# Patient Record
Sex: FEMALE | Race: BLACK OR AFRICAN AMERICAN | Employment: PART TIME | URBAN - METROPOLITAN AREA
[De-identification: names, ages, dates, MRNs, and addresses within clinical notes are randomized per-mention and may not be internally consistent; named-entity substitution may affect disease eponyms.]

---
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
## 2023-09-28 ENCOUNTER — OFFICE VISIT (OUTPATIENT)
Dept: FAMILY MEDICINE CLINIC | Age: 23
End: 2023-09-28
Payer: COMMERCIAL

## 2023-09-28 VITALS
WEIGHT: 165 LBS | BODY MASS INDEX: 29.23 KG/M2 | DIASTOLIC BLOOD PRESSURE: 60 MMHG | SYSTOLIC BLOOD PRESSURE: 100 MMHG | OXYGEN SATURATION: 99 % | HEIGHT: 63 IN | HEART RATE: 71 BPM | TEMPERATURE: 97.1 F

## 2023-09-28 DIAGNOSIS — S81.831S GUNSHOT WOUND OF RIGHT LOWER LEG, SEQUELA: ICD-10-CM

## 2023-09-28 DIAGNOSIS — M21.372 ACQUIRED LEFT FOOT DROP: ICD-10-CM

## 2023-09-28 DIAGNOSIS — S81.832S GUNSHOT WOUND OF LEFT LOWER EXTREMITY, SEQUELA: Primary | ICD-10-CM

## 2023-09-28 DIAGNOSIS — R20.0 NUMBNESS OF RIGHT LOWER EXTREMITY: ICD-10-CM

## 2023-09-28 PROCEDURE — 99204 OFFICE O/P NEW MOD 45 MIN: CPT | Performed by: FAMILY MEDICINE

## 2023-09-28 RX ORDER — GABAPENTIN 100 MG/1
100 CAPSULE ORAL 2 TIMES DAILY
Qty: 60 CAPSULE | Refills: 0 | Status: SHIPPED | OUTPATIENT
Start: 2023-09-28 | End: 2023-10-28

## 2023-09-28 SDOH — ECONOMIC STABILITY: FOOD INSECURITY: WITHIN THE PAST 12 MONTHS, YOU WORRIED THAT YOUR FOOD WOULD RUN OUT BEFORE YOU GOT MONEY TO BUY MORE.: SOMETIMES TRUE

## 2023-09-28 SDOH — ECONOMIC STABILITY: FOOD INSECURITY: WITHIN THE PAST 12 MONTHS, THE FOOD YOU BOUGHT JUST DIDN'T LAST AND YOU DIDN'T HAVE MONEY TO GET MORE.: SOMETIMES TRUE

## 2023-09-28 SDOH — ECONOMIC STABILITY: INCOME INSECURITY: HOW HARD IS IT FOR YOU TO PAY FOR THE VERY BASICS LIKE FOOD, HOUSING, MEDICAL CARE, AND HEATING?: HARD

## 2023-09-28 SDOH — ECONOMIC STABILITY: HOUSING INSECURITY
IN THE LAST 12 MONTHS, WAS THERE A TIME WHEN YOU DID NOT HAVE A STEADY PLACE TO SLEEP OR SLEPT IN A SHELTER (INCLUDING NOW)?: NO

## 2023-09-28 ASSESSMENT — PATIENT HEALTH QUESTIONNAIRE - PHQ9
9. THOUGHTS THAT YOU WOULD BE BETTER OFF DEAD, OR OF HURTING YOURSELF: 0
SUM OF ALL RESPONSES TO PHQ QUESTIONS 1-9: 0
4. FEELING TIRED OR HAVING LITTLE ENERGY: 0
SUM OF ALL RESPONSES TO PHQ QUESTIONS 1-9: 0
5. POOR APPETITE OR OVEREATING: 0
7. TROUBLE CONCENTRATING ON THINGS, SUCH AS READING THE NEWSPAPER OR WATCHING TELEVISION: 0
2. FEELING DOWN, DEPRESSED OR HOPELESS: 0
6. FEELING BAD ABOUT YOURSELF - OR THAT YOU ARE A FAILURE OR HAVE LET YOURSELF OR YOUR FAMILY DOWN: 0
1. LITTLE INTEREST OR PLEASURE IN DOING THINGS: 0
SUM OF ALL RESPONSES TO PHQ9 QUESTIONS 1 & 2: 0
8. MOVING OR SPEAKING SO SLOWLY THAT OTHER PEOPLE COULD HAVE NOTICED. OR THE OPPOSITE, BEING SO FIGETY OR RESTLESS THAT YOU HAVE BEEN MOVING AROUND A LOT MORE THAN USUAL: 0
10. IF YOU CHECKED OFF ANY PROBLEMS, HOW DIFFICULT HAVE THESE PROBLEMS MADE IT FOR YOU TO DO YOUR WORK, TAKE CARE OF THINGS AT HOME, OR GET ALONG WITH OTHER PEOPLE: 0
3. TROUBLE FALLING OR STAYING ASLEEP: 0
SUM OF ALL RESPONSES TO PHQ QUESTIONS 1-9: 0
SUM OF ALL RESPONSES TO PHQ QUESTIONS 1-9: 0
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
## 2023-10-01 ASSESSMENT — ENCOUNTER SYMPTOMS
VOMITING: 0
ABDOMINAL PAIN: 0
BLOOD IN STOOL: 0
CONSTIPATION: 0
NAUSEA: 0
APNEA: 0
SHORTNESS OF BREATH: 0
CHEST TIGHTNESS: 0
COUGH: 0
DIARRHEA: 0

## 2023-10-17 ENCOUNTER — HOSPITAL ENCOUNTER (OUTPATIENT)
Dept: MRI IMAGING | Age: 23
Discharge: HOME | End: 2023-10-19
Payer: COMMERCIAL

## 2023-10-17 DIAGNOSIS — M21.372 ACQUIRED LEFT FOOT DROP: ICD-10-CM

## 2023-10-17 DIAGNOSIS — S81.832S GUNSHOT WOUND OF LEFT LOWER EXTREMITY, SEQUELA: ICD-10-CM

## 2023-10-17 PROCEDURE — 73718 MRI LOWER EXTREMITY W/O DYE: CPT

## 2023-10-25 ENCOUNTER — OFFICE VISIT (OUTPATIENT)
Dept: FAMILY MEDICINE CLINIC | Age: 23
End: 2023-10-25
Payer: COMMERCIAL

## 2023-10-25 VITALS
OXYGEN SATURATION: 100 % | BODY MASS INDEX: 29.23 KG/M2 | TEMPERATURE: 98 F | DIASTOLIC BLOOD PRESSURE: 62 MMHG | HEIGHT: 63 IN | HEART RATE: 73 BPM | WEIGHT: 165 LBS | SYSTOLIC BLOOD PRESSURE: 118 MMHG

## 2023-10-25 DIAGNOSIS — M21.372 ACQUIRED LEFT FOOT DROP: ICD-10-CM

## 2023-10-25 DIAGNOSIS — R20.0 NUMBNESS OF RIGHT LOWER EXTREMITY: Primary | ICD-10-CM

## 2023-10-25 PROCEDURE — 99213 OFFICE O/P EST LOW 20 MIN: CPT | Performed by: FAMILY MEDICINE

## 2023-10-25 PROCEDURE — 1036F TOBACCO NON-USER: CPT | Performed by: FAMILY MEDICINE

## 2023-10-25 PROCEDURE — G8427 DOCREV CUR MEDS BY ELIG CLIN: HCPCS | Performed by: FAMILY MEDICINE

## 2023-10-25 PROCEDURE — G8419 CALC BMI OUT NRM PARAM NOF/U: HCPCS | Performed by: FAMILY MEDICINE

## 2023-10-25 PROCEDURE — G8484 FLU IMMUNIZE NO ADMIN: HCPCS | Performed by: FAMILY MEDICINE

## 2023-10-25 RX ORDER — GABAPENTIN 100 MG/1
100 CAPSULE ORAL 2 TIMES DAILY
Qty: 60 CAPSULE | Refills: 0 | Status: SHIPPED | OUTPATIENT
Start: 2023-10-25 | End: 2023-11-24
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
## 2023-10-27 ASSESSMENT — ENCOUNTER SYMPTOMS
BLOOD IN STOOL: 0
CONSTIPATION: 0
COUGH: 0
CHEST TIGHTNESS: 0
APNEA: 0
ABDOMINAL PAIN: 0
SHORTNESS OF BREATH: 0
VOMITING: 0
DIARRHEA: 0
NAUSEA: 0

## 2024-01-03 ENCOUNTER — OFFICE VISIT (OUTPATIENT)
Dept: FAMILY MEDICINE CLINIC | Age: 24
End: 2024-01-03
Payer: COMMERCIAL

## 2024-01-03 VITALS
BODY MASS INDEX: 29.9 KG/M2 | WEIGHT: 168.8 LBS | DIASTOLIC BLOOD PRESSURE: 60 MMHG | SYSTOLIC BLOOD PRESSURE: 92 MMHG | HEART RATE: 63 BPM | TEMPERATURE: 97.8 F | OXYGEN SATURATION: 99 %

## 2024-01-03 DIAGNOSIS — S81.831S GUNSHOT WOUND OF RIGHT LOWER LEG, SEQUELA: ICD-10-CM

## 2024-01-03 DIAGNOSIS — R20.0 NUMBNESS OF RIGHT LOWER EXTREMITY: ICD-10-CM

## 2024-01-03 DIAGNOSIS — M21.372 ACQUIRED LEFT FOOT DROP: Primary | ICD-10-CM

## 2024-01-03 DIAGNOSIS — S81.802S NON-HEALING WOUND OF LOWER EXTREMITY, LEFT, SEQUELA: ICD-10-CM

## 2024-01-03 PROCEDURE — 99214 OFFICE O/P EST MOD 30 MIN: CPT | Performed by: FAMILY MEDICINE

## 2024-01-03 PROCEDURE — G8419 CALC BMI OUT NRM PARAM NOF/U: HCPCS | Performed by: FAMILY MEDICINE

## 2024-01-03 PROCEDURE — G8484 FLU IMMUNIZE NO ADMIN: HCPCS | Performed by: FAMILY MEDICINE

## 2024-01-03 PROCEDURE — G8427 DOCREV CUR MEDS BY ELIG CLIN: HCPCS | Performed by: FAMILY MEDICINE

## 2024-01-03 PROCEDURE — 1036F TOBACCO NON-USER: CPT | Performed by: FAMILY MEDICINE

## 2024-01-03 ASSESSMENT — ENCOUNTER SYMPTOMS
CHEST TIGHTNESS: 0
DIARRHEA: 0
APNEA: 0
NAUSEA: 0
BLOOD IN STOOL: 0
VOMITING: 0
COUGH: 0
ABDOMINAL PAIN: 0
SHORTNESS OF BREATH: 0
CONSTIPATION: 0

## 2024-01-03 ASSESSMENT — PATIENT HEALTH QUESTIONNAIRE - PHQ9
7. TROUBLE CONCENTRATING ON THINGS, SUCH AS READING THE NEWSPAPER OR WATCHING TELEVISION: 0
SUM OF ALL RESPONSES TO PHQ QUESTIONS 1-9: 0
3. TROUBLE FALLING OR STAYING ASLEEP: 0
SUM OF ALL RESPONSES TO PHQ QUESTIONS 1-9: 0
1. LITTLE INTEREST OR PLEASURE IN DOING THINGS: 0
5. POOR APPETITE OR OVEREATING: 0
SUM OF ALL RESPONSES TO PHQ9 QUESTIONS 1 & 2: 0
6. FEELING BAD ABOUT YOURSELF - OR THAT YOU ARE A FAILURE OR HAVE LET YOURSELF OR YOUR FAMILY DOWN: 0
SUM OF ALL RESPONSES TO PHQ QUESTIONS 1-9: 0
SUM OF ALL RESPONSES TO PHQ QUESTIONS 1-9: 0
4. FEELING TIRED OR HAVING LITTLE ENERGY: 0
9. THOUGHTS THAT YOU WOULD BE BETTER OFF DEAD, OR OF HURTING YOURSELF: 0
8. MOVING OR SPEAKING SO SLOWLY THAT OTHER PEOPLE COULD HAVE NOTICED. OR THE OPPOSITE, BEING SO FIGETY OR RESTLESS THAT YOU HAVE BEEN MOVING AROUND A LOT MORE THAN USUAL: 0
10. IF YOU CHECKED OFF ANY PROBLEMS, HOW DIFFICULT HAVE THESE PROBLEMS MADE IT FOR YOU TO DO YOUR WORK, TAKE CARE OF THINGS AT HOME, OR GET ALONG WITH OTHER PEOPLE: 0
2. FEELING DOWN, DEPRESSED OR HOPELESS: 0
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
## 2024-01-11 ENCOUNTER — HOSPITAL ENCOUNTER (OUTPATIENT)
Dept: WOUND CARE | Age: 24
Discharge: HOME | End: 2024-01-11
Payer: COMMERCIAL

## 2024-01-11 ENCOUNTER — HOSPITAL ENCOUNTER (OUTPATIENT)
Dept: PHYSICAL THERAPY | Age: 24
Setting detail: THERAPIES SERIES
Discharge: HOME | End: 2024-01-11
Payer: COMMERCIAL

## 2024-01-11 VITALS
DIASTOLIC BLOOD PRESSURE: 63 MMHG | HEART RATE: 64 BPM | SYSTOLIC BLOOD PRESSURE: 114 MMHG | TEMPERATURE: 97.9 F | RESPIRATION RATE: 16 BRPM

## 2024-01-11 DIAGNOSIS — S81.832S: ICD-10-CM

## 2024-01-11 DIAGNOSIS — S86.212A TRAUMATIC TEAR OF LEFT ANTERIOR TIBIALIS TENDON: ICD-10-CM

## 2024-01-11 DIAGNOSIS — S81.831S: Primary | ICD-10-CM

## 2024-01-11 PROCEDURE — 99212 OFFICE O/P EST SF 10 MIN: CPT

## 2024-01-11 PROCEDURE — 97162 PT EVAL MOD COMPLEX 30 MIN: CPT

## 2024-01-11 PROCEDURE — 99203 OFFICE O/P NEW LOW 30 MIN: CPT | Performed by: PODIATRIST

## 2024-01-11 ASSESSMENT — PAIN DESCRIPTION - LOCATION
LOCATION: LEG
LOCATION: LEG

## 2024-01-11 ASSESSMENT — PAIN DESCRIPTION - ORIENTATION
ORIENTATION: LEFT
ORIENTATION: LEFT

## 2024-01-11 ASSESSMENT — PAIN SCALES - GENERAL
PAINLEVEL_OUTOF10: 6
PAINLEVEL_OUTOF10: 6

## 2024-01-11 ASSESSMENT — PAIN DESCRIPTION - PAIN TYPE: TYPE: ACUTE PAIN;CHRONIC PAIN

## 2024-01-11 ASSESSMENT — PAIN DESCRIPTION - FREQUENCY: FREQUENCY: CONTINUOUS

## 2024-01-11 NOTE — DISCHARGE INSTRUCTIONS
Wound Clinic Physician Orders and Discharge Instructions  [address] [address]  Telephone: [phone]     FAX [phone]    NAME:  Ingrid Moser  YOB: 2000  MEDICAL RECORD NUMBER:  [id number]  DATE:  1/11/2024    Congratulations!! You have completed your treatment.   1. Return to your Primary Care Physician for all your health issues.   2. Resume your ordinary activities as tolerated.   3. Take your medications as prescribed by your primary care physician.   4. Check your skin daily for cracks, bruises, sores, or dryness. Use a moisturizer as needed.   5. Clean and dry your skin, using mild soap and warm water (not hot).   6. Avoid alcohol and caffeine and do not smoke.   7. Maintain a nutritious diet.   8. Avoid pressure on your wound site. Keep your legs elevated above the level of the heart whenever possible.   9. Continue to use wraps/stockings/compression as prescribed.   10. Replace compression stockings every four to six months as needed to ensure proper fit.   11. Wear well-fitting shoes and leg garments.           Apply Cream daily to the newly HEALED AREA. (vaseline, aquaphor) or over the counter Vitamin E cream. May purchase \"scar away\" gel or the silicone sheets (target) use as directed.   BE CAREFUL WHEN CLEANSING THE AREA - LET THE SOAP AND WATER RUN OVER THE AREA - DO NOT RUB.  WHEN DRYING THE AREA, PAT DRY  Continue going to Physical Therapy.   THANK YOU FOR ALLOWING US TO SERVE YOU. PLEASE CALL IF YOU DEVELOP ANOTHER WOUND. [phone]
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
## 2024-01-12 ENCOUNTER — HOSPITAL ENCOUNTER (OUTPATIENT)
Dept: NEUROLOGY | Age: 24
Discharge: HOME | End: 2024-01-12
Payer: COMMERCIAL

## 2024-01-12 DIAGNOSIS — R20.0 NUMBNESS OF RIGHT LOWER EXTREMITY: ICD-10-CM

## 2024-01-12 PROCEDURE — 95910 NRV CNDJ TEST 7-8 STUDIES: CPT

## 2024-01-12 PROCEDURE — 95886 MUSC TEST DONE W/N TEST COMP: CPT

## 2024-01-16 ENCOUNTER — HOSPITAL ENCOUNTER (OUTPATIENT)
Dept: PHYSICAL THERAPY | Age: 24
Setting detail: THERAPIES SERIES
Discharge: HOME | End: 2024-01-16
Payer: COMMERCIAL

## 2024-01-16 PROCEDURE — 97110 THERAPEUTIC EXERCISES: CPT

## 2024-01-16 ASSESSMENT — PAIN DESCRIPTION - PAIN TYPE: TYPE: ACUTE PAIN

## 2024-01-16 ASSESSMENT — PAIN DESCRIPTION - DESCRIPTORS: DESCRIPTORS: HEAVINESS

## 2024-01-16 ASSESSMENT — PAIN SCALES - GENERAL: PAINLEVEL_OUTOF10: 6

## 2024-01-16 ASSESSMENT — PAIN DESCRIPTION - LOCATION: LOCATION: LEG

## 2024-01-16 ASSESSMENT — PAIN DESCRIPTION - ORIENTATION: ORIENTATION: LEFT

## 2024-01-16 NOTE — PROGRESS NOTES
[address]   [place] [id number]  Phone:[phone]      Physical TherapyTreatment Note        Date: 2024  Patient: Ingrid Moser  : 2000   Confirmed: Yes  MRN: [id number]  Referring Provider: Bhanu Comer MD      Medical Diagnosis: Foot drop, left foot [M21.372]  Puncture wound without foreign body, right lower leg, sequela [S81.831S]  Anesthesia of skin [R20.0]      Treatment Diagnosis: impaired ankle ROM, impaired LE strength, left ankle pain    Visit Information:  Insurance: Payor: [employer] / Plan: [employer] [place] MEDICA[place] / Product Type: *No Product type* /   PT Visit Information  PT Insurance Information: [email or web address]  Total # of Visits to Date: 2  No Show: 0  Canceled Appointment: 0  Progress Note Counter: ( 48 units 24-3/8/24)    Subjective Information:  Subjective: Pt reports that she had a EMG awaiting the results from dr. KAY Compliance:  [x] Good [] Fair [] Poor [] Reports not doing due to:    Pain Screening  Patient Currently in Pain: Yes  Pain Assessment: 0-10  Pain Level: 6  Pain Type: Acute pain  Pain Location: Leg  Pain Orientation: Left  Pain Descriptors: Heaviness    Treatment:  Exercises:  Exercises  Exercise 1: long sitting with strap gastroc stretch 30 sec hold x 3, bilateral  Exercise 2: ankle AROM x 5  Exercise 3: ankle circles x 10  Exercise 4: ankle alphabet x 1 on right and L  Exercise 5: ankle Tband $ way RTB x10  Exercise 7: 3 way SLR x10, bridges*       Manual:   Manual Therapy  Other: L ankle Medial to lateral foot into eversion/df KT       Assessment:   Body Structures, Functions, Activity Limitations Requiring Skilled Therapeutic Intervention: Decreased ROM, Decreased strength, Decreased endurance, Decreased balance, Increased pain  Assessment: Initiated LE and ankle strengthening exercises with some increased discomfort in L shin with ROM on L. Applied KT to L ankle Medial to lateral to help support the L foot.  Treatment Diagnosis:

## 2024-01-18 ENCOUNTER — APPOINTMENT (OUTPATIENT)
Dept: PHYSICAL THERAPY | Age: 24
End: 2024-01-18
Payer: COMMERCIAL

## 2024-01-18 ENCOUNTER — HOSPITAL ENCOUNTER (OUTPATIENT)
Dept: PHYSICAL THERAPY | Age: 24
Setting detail: THERAPIES SERIES
Discharge: HOME | End: 2024-01-18
Payer: COMMERCIAL

## 2024-01-18 PROCEDURE — 97110 THERAPEUTIC EXERCISES: CPT

## 2024-01-18 ASSESSMENT — PAIN SCALES - GENERAL: PAINLEVEL_OUTOF10: 0
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
## 2024-01-24 ENCOUNTER — HOSPITAL ENCOUNTER (OUTPATIENT)
Dept: PHYSICAL THERAPY | Age: 24
Setting detail: THERAPIES SERIES
Discharge: HOME | End: 2024-01-24
Payer: COMMERCIAL

## 2024-01-24 PROCEDURE — 97110 THERAPEUTIC EXERCISES: CPT

## 2024-01-24 PROCEDURE — 97140 MANUAL THERAPY 1/> REGIONS: CPT

## 2024-01-24 ASSESSMENT — PAIN DESCRIPTION - DESCRIPTORS: DESCRIPTORS: HEAVINESS

## 2024-01-24 ASSESSMENT — PAIN DESCRIPTION - LOCATION: LOCATION: LEG

## 2024-01-24 ASSESSMENT — PAIN SCALES - GENERAL: PAINLEVEL_OUTOF10: 3

## 2024-01-24 ASSESSMENT — PAIN DESCRIPTION - ORIENTATION: ORIENTATION: LEFT

## 2024-01-24 NOTE — PROGRESS NOTES
training, Equipment evaluation, education, & procurement, Modalities, Dry needling  Modalities: Heat/Cold, Ultrasound  Pt to continue current HEP.  See objective section for any therapeutic exercise changes, additions or modifications this date.    Therapy Time:      PT Individual Minutes  Time In: 1352  Time Out: 1435  Minutes: 43  Timed Code Treatment Minutes: 43 Minutes    Timed Activity Minutes Units   Ther Ex 33 2   Manual  10 1     Electronically signed by Sis Zamora PTA on 1/24/24 at 4:46 PM EST

## 2024-01-25 ENCOUNTER — HOSPITAL ENCOUNTER (OUTPATIENT)
Dept: PHYSICAL THERAPY | Age: 24
Setting detail: THERAPIES SERIES
Discharge: HOME | End: 2024-01-25
Payer: COMMERCIAL

## 2024-01-25 ENCOUNTER — OFFICE VISIT (OUTPATIENT)
Dept: FAMILY MEDICINE CLINIC | Age: 24
End: 2024-01-25
Payer: COMMERCIAL

## 2024-01-25 VITALS
SYSTOLIC BLOOD PRESSURE: 94 MMHG | WEIGHT: 175.2 LBS | TEMPERATURE: 97.2 F | BODY MASS INDEX: 31.04 KG/M2 | DIASTOLIC BLOOD PRESSURE: 62 MMHG | HEART RATE: 69 BPM | OXYGEN SATURATION: 100 %

## 2024-01-25 DIAGNOSIS — R20.0 NUMBNESS OF RIGHT LOWER EXTREMITY: Primary | ICD-10-CM

## 2024-01-25 DIAGNOSIS — R20.2 PARESTHESIA: ICD-10-CM

## 2024-01-25 PROCEDURE — G8427 DOCREV CUR MEDS BY ELIG CLIN: HCPCS | Performed by: FAMILY MEDICINE

## 2024-01-25 PROCEDURE — 97110 THERAPEUTIC EXERCISES: CPT

## 2024-01-25 PROCEDURE — G8417 CALC BMI ABV UP PARAM F/U: HCPCS | Performed by: FAMILY MEDICINE

## 2024-01-25 PROCEDURE — 99213 OFFICE O/P EST LOW 20 MIN: CPT | Performed by: FAMILY MEDICINE

## 2024-01-25 PROCEDURE — 1036F TOBACCO NON-USER: CPT | Performed by: FAMILY MEDICINE

## 2024-01-25 PROCEDURE — G8484 FLU IMMUNIZE NO ADMIN: HCPCS | Performed by: FAMILY MEDICINE

## 2024-01-25 RX ORDER — GABAPENTIN 300 MG/1
300 CAPSULE ORAL 3 TIMES DAILY
Qty: 90 CAPSULE | Refills: 0 | Status: SHIPPED | OUTPATIENT
Start: 2024-01-25 | End: 2024-02-24

## 2024-01-25 ASSESSMENT — PAIN DESCRIPTION - ORIENTATION: ORIENTATION: LEFT;RIGHT

## 2024-01-25 ASSESSMENT — PAIN DESCRIPTION - LOCATION: LOCATION: LEG

## 2024-01-25 ASSESSMENT — PAIN SCALES - GENERAL: PAINLEVEL_OUTOF10: 0

## 2024-01-25 NOTE — PROGRESS NOTES
[address]   [place] [address]  Phone:[phone]      Physical TherapyTreatment Note        Date: 2024  Patient: Ingrid Moser  : 2000   Confirmed: Yes  MRN: [id number]  Referring Provider: Bhanu Comer MD      Medical Diagnosis: Foot drop, left foot [M21.372]  Puncture wound without foreign body, right lower leg, sequela [S81.831S]  Anesthesia of skin [R20.0]      Treatment Diagnosis: impaired ankle ROM, impaired LE strength, left ankle pain    Visit Information:  Insurance: Payor: [employer] / Plan: [place] / Product Type: *No Product type* /   PT Visit Information  PT Insurance Information: [employer]  Total # of Visits to Date: 4  No Show: 0  Canceled Appointment: 0  Progress Note Counter:  ( units 24-3/8/24)    Subjective Information:  Subjective: Pt reports that she went to see dr and he is increasing her gabapentin.  HEP Compliance:  [x] Good [] Fair [] Poor [] Reports not doing due to:    Pain Screening  Patient Currently in Pain: Yes  Pain Assessment: 0-10  Pain Level: 0  Pain Location: Leg  Pain Orientation: Left, Right    Treatment:  Exercises:  Exercises  Exercise 1: long sitting with strap gastroc stretch 30 sec hold x 3, bilateral  Exercise 5: ankle Tband 4 way RTB x10  Exercise 6: BAPS board 2 way l2  Exercise 7: 3 way SLR x10, bridges x10  Exercise 8: SLS 10 secx3 , tandem, foam*  Exercise 9: standing HR x10/TR*  Exercise 10: step ups fwd and lateral  Exercise 11: supine hip circles x10  Treatment Reasoning  Limitations addressed: Mobility, Strength, Flexibility  Functional ability(s) targeted: Ambulating community distances, Tolerance to age appropriate activities      *Indicates exercise, modality, or manual techniques to be initiated when appropriate        Assessment:   Body Structures, Functions, Activity Limitations Requiring Skilled Therapeutic Intervention: Decreased ROM, Decreased strength, Decreased endurance,
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
## 2024-01-31 ASSESSMENT — ENCOUNTER SYMPTOMS
DIARRHEA: 0
VOMITING: 0
COUGH: 0
CONSTIPATION: 0
NAUSEA: 0
ABDOMINAL PAIN: 0
CHEST TIGHTNESS: 0
APNEA: 0
SHORTNESS OF BREATH: 0
BLOOD IN STOOL: 0

## 2024-02-01 ENCOUNTER — HOSPITAL ENCOUNTER (OUTPATIENT)
Dept: PHYSICAL THERAPY | Age: 24
Setting detail: THERAPIES SERIES
Discharge: HOME | End: 2024-02-01

## 2024-02-01 NOTE — PROGRESS NOTES
Therapy                            Cancellation/No-show Note    Date: 2024  Patient: Ingrid Moser (23 y.o. female)  : 2000  MRN:  [id number]  Referring Physician: Bhanu Comer MD    Medical Diagnosis: Foot drop, left foot [M21.372]  Puncture wound without foreign body, right lower leg, sequela [S81.831S]  Anesthesia of skin [R20.0]      Visit Information:  Insurance: Payor: [facility] / Plan: [facility] MEDICAID / Product Type: *No Product type* /   Visits to Date: 4   No Show/Cancelled Appts:       For today's appointment patient:  [x]  Cancelled  []  Rescheduled appointment  []  No-show   []  Called pt to remind of next appointment     Reason given by patient:  []  Patient ill  []  Conflicting appointment  []  No transportation    []  Conflict with work  []  No reason given  [x]  Other:  can't make it     [x] Pt has future appointments scheduled, no follow up needed  [] Pt requests to be on hold.    Reason:   If > 2 weeks please discuss with therapist.  [] Therapist to call pt for follow up     Comments:       Signature: Electronically signed by Sis Zamora PTA on 24 at 3:13 PM EST    Electronically signed by Vanda Mustafa PT on 2024 at 4:18 PM

## 2024-02-08 ENCOUNTER — HOSPITAL ENCOUNTER (OUTPATIENT)
Dept: PHYSICAL THERAPY | Age: 24
Setting detail: THERAPIES SERIES
Discharge: HOME | End: 2024-02-08
Payer: COMMERCIAL

## 2024-02-08 PROCEDURE — 97110 THERAPEUTIC EXERCISES: CPT

## 2024-02-08 ASSESSMENT — PAIN SCALES - GENERAL: PAINLEVEL_OUTOF10: 7

## 2024-02-08 ASSESSMENT — PAIN DESCRIPTION - ORIENTATION: ORIENTATION: RIGHT;LEFT

## 2024-02-08 ASSESSMENT — PAIN DESCRIPTION - LOCATION: LOCATION: LEG

## 2024-02-08 NOTE — PROGRESS NOTES
[address]   [place] [address]  Phone:[phone]      Physical TherapyTreatment Note        Date: 2024  Patient: Ingrid Moser  : 2000   Confirmed: Yes  MRN: [id number]  Referring Provider: Bhanu Comer MD      Medical Diagnosis: Foot drop, left foot [M21.372]  Puncture wound without foreign body, right lower leg, sequela [S81.831S]  Anesthesia of skin [R20.0]      Treatment Diagnosis: impaired ankle ROM, impaired LE strength, left ankle pain    Visit Information:  Insurance: Payor: [facility] / [facility] [facility] [place] [place] / Product Type: *No Product type* /   PT Visit Information  PT Insurance Information: [employer]  Total # of Visits to Date: 5  No Show: 0  Canceled Appointment: 0  Progress Note Counter:  (15/ 48 units 24-3/8/24)    Subjective Information:  Subjective: Pt reports that she has been walking more at home.  HEP Compliance:  [x] Good [] Fair [] Poor [] Reports not doing due to:    Pain Screening  Patient Currently in Pain: Yes  Pain Assessment: 0-10  Pain Level: 7  Pain Location: Leg  Pain Orientation: Right, Left    Treatment:  Exercises:  Exercises  Exercise 1: long sitting with strap gastroc stretch 30 sec hold x 3, bilateral  Exercise 5: ankle Tband 4 way GTB x15  Exercise 6: BAPS board 2 way l2  Exercise 7: 3 way SLR 2x10 flexion abduction x10 , bridges x10  Exercise 8: SLS 10 secx3 , tandem, foam*      *Indicates exercise, modality, or manual techniques to be initiated when appropriate    Objective Measures:     STG 2 Current Status:: talked to pt about increased compliance.      Assessment:   Body Structures, Functions, Activity Limitations Requiring Skilled Therapeutic Intervention: Decreased ROM, Decreased strength, Decreased endurance, Decreased balance, Increased pain  Assessment: Pt with some increased pain with SLS with the knee that was bent. Pt progressing as leslie with increase resistance band.  Treatment Diagnosis: impaired ankle

## 2024-02-13 ENCOUNTER — HOSPITAL ENCOUNTER (OUTPATIENT)
Dept: PHYSICAL THERAPY | Age: 24
Setting detail: THERAPIES SERIES
Discharge: HOME | End: 2024-02-13
Payer: COMMERCIAL

## 2024-02-13 PROCEDURE — 97110 THERAPEUTIC EXERCISES: CPT

## 2024-02-13 NOTE — PROGRESS NOTES
[address]   [place] [address]  Phone:[phone]      Physical TherapyTreatment Note        Date: 2024  Patient: Ingrid Moser  : 2000   Confirmed: Yes  MRN: [id number]  Referring Provider: Bhanu Comer MD      Medical Diagnosis: Foot drop, left foot [M21.372]  Puncture wound without foreign body, right lower leg, sequela [S81.831S]  Anesthesia of skin [R20.0]      Treatment Diagnosis: impaired ankle ROM, impaired LE strength, left ankle pain    Visit Information:  Insurance: Payor: [employer] / [facility] [place] [facility] / Product Type: *No Product type* /   PT Visit Information  PT Insurance Information: [employer]  Total # of Visits to Date: 6  No Show: 0  Canceled Appointment: 0  Progress Note Counter:  ( units 24-3/8/24)    Subjective Information:  Subjective: Pt reports that she got a job today.  HEP Compliance:  [x] Good [] Fair [] Poor [] Reports not doing due to:    Pain Screening  Patient Currently in Pain: Yes  Pain Assessment: 0-10  Pain Level: 0  Pain Location: Leg  Pain Orientation: Right, Left    Treatment:  Exercises:  Exercises  Exercise 3: gastroc stretch off step x30 sec  Exercise 5: ankle Tband 4 way GTB x15  Exercise 7: 4 way x10 YTB  Exercise 8: SLS 15 secx3 , tandem standing , foam standing on foam, 2 feet SLS  Exercise 9: standing HR x10/TR  Treatment Reasoning  Limitations addressed: Mobility, Strength, Flexibility  Functional ability(s) targeted: Ambulating community distances, Tolerance to age appropriate activities    *Indicates exercise, modality, or manual techniques to be initiated when appropriate    Objective Measures:     STG 2 Current Status:: Issued ankle and hip with resistance band.              LTG 2 Current Status:: Ray DF 4/5 with pain, ray knees extension  4+/, knee flexion 4/5               Assessment:   Body Structures, Functions, Activity Limitations Requiring Skilled Therapeutic Intervention: Decreased ROM,

## 2024-02-20 ENCOUNTER — HOSPITAL ENCOUNTER (OUTPATIENT)
Dept: PHYSICAL THERAPY | Age: 24
Setting detail: THERAPIES SERIES
Discharge: HOME | End: 2024-02-20
Payer: COMMERCIAL

## 2024-02-20 PROCEDURE — 97110 THERAPEUTIC EXERCISES: CPT

## 2024-02-20 ASSESSMENT — PAIN SCALES - GENERAL: PAINLEVEL_OUTOF10: 0

## 2024-02-20 NOTE — PROGRESS NOTES
[address]   [place] [address]  Phone:[phone]      Physical TherapyTreatment Note        Date: 2024  Patient: Ingrid Moser  : 2000   Confirmed: Yes  MRN: [id number]  Referring Provider: Bhanu Comer MD      Medical Diagnosis: Foot drop, left foot [M21.372]  Puncture wound without foreign body, right lower leg, sequela [S81.831S]  Anesthesia of skin [R20.0]      Treatment Diagnosis: impaired ankle ROM, impaired LE strength, left ankle pain    Visit Information:  Insurance: Payor: [facility] / [facility] / Product Type: *No Product type* /   PT Visit Information  PT Insurance Information: [email or web address]  Total # of Visits to Date: 6  No Show: 0  Canceled Appointment: 0  Progress Note Counter:  ( units 24-3/8/24)    Subjective Information:  Subjective: Pt reports that the nerve pain is still about the same.  HEP Compliance:  [x] Good [] Fair [] Poor [] Reports not doing due to:    Pain Screening  Patient Currently in Pain: No  Pain Assessment: 0-10  Pain Level: 0    Treatment:  Exercises:  Exercises  Exercise 3: gastroc stretch off step x30 sec  Exercise 7: 4 way hip x10 YTB  Exercise 8: SLS 15 secx3 , tandem standing , foam standing on foam, 2 feet SLS  Exercise 9: standing HR x10/TR  Exercise 10: gait drills march, lateral, retro heel walk 20 feet each unable to toe walk  Exercise 11: step ups fwd x10        Assessment:   Body Structures, Functions, Activity Limitations Requiring Skilled Therapeutic Intervention: Decreased ROM, Decreased strength, Decreased endurance, Decreased balance, Increased pain  Assessment: Pt with decreased ability to toe walk. Pt L Le fatigued with ankle exercises.  Treatment Diagnosis: impaired ankle ROM, impaired LE strength, left ankle pain  Therapy Prognosis: Good          Post-Pain Assessment:       Pain Rating (0-10 pain scale): 0  /10   Location and pain description same as pre-treatment unless indicated.  Her/She

## 2024-02-22 ENCOUNTER — HOSPITAL ENCOUNTER (OUTPATIENT)
Dept: PHYSICAL THERAPY | Age: 24
Setting detail: THERAPIES SERIES
Discharge: HOME | End: 2024-02-22
Payer: COMMERCIAL

## 2024-02-22 PROCEDURE — 97110 THERAPEUTIC EXERCISES: CPT

## 2024-02-22 ASSESSMENT — PAIN SCALES - GENERAL: PAINLEVEL_OUTOF10: 0

## 2024-02-22 NOTE — PROGRESS NOTES
[address]   [place] [id number]  Phone:[phone]      Physical TherapyTreatment Note        Date: 2024  Patient: Ingrid Moser  : 2000   Confirmed: Yes  MRN: [id number]  Referring Provider: Bhanu Comer MD      Medical Diagnosis: Foot drop, left foot [M21.372]  Puncture wound without foreign body, right lower leg, sequela [S81.831S]  Anesthesia of skin [R20.0]      Treatment Diagnosis: impaired ankle ROM, impaired LE strength, left ankle pain    Visit Information:  Insurance: Payor: [facility] / [facility] / Product Type: *No Product type* /   PT Visit Information  PT Insurance Information: [employer]  Total # of Visits to Date: 6  No Show: 0  Canceled Appointment: 0  Progress Note Counter:  ( units 24-3/8/24)    Subjective Information:  Subjective: Pt reports a park of pain yesterday that came and left.  HEP Compliance:  [x] Good [] Fair [] Poor [] Reports not doing due to:    Pain Screening  Patient Currently in Pain: No  Pain Assessment: 0-10  Pain Level: 0    Treatment:  Exercises:  Exercises  Exercise 3: gastroc stretch off step x30 sec  Exercise 7: 4 way hip x10 YTB  Exercise 8: SLS 15 secx3 on foam , tandem standing , foam standing on foam  Exercise 12: hip abduction and extension x10  Exercise 13: GTB knee flexion x10  Treatment Reasoning  Limitations addressed: Mobility, Strength, Flexibility  Functional ability(s) targeted: Ambulating community distances, Tolerance to age appropriate activities    *Indicates exercise, modality, or manual techniques to be initiated when appropriate    Objective Measures:     LTG 2 Current Status:: ankle strength 5/5, DEMETRA hip flexion 5/5 ABduction 4/5 extenstion 4/5 KNee extension 5/5 knee flexion 4+/5      Assessment:   Body Structures, Functions, Activity Limitations Requiring Skilled Therapeutic Intervention: Decreased ROM, Decreased strength, Decreased endurance, Decreased balance, Increased

## 2024-02-28 ENCOUNTER — HOSPITAL ENCOUNTER (OUTPATIENT)
Dept: PHYSICAL THERAPY | Age: 24
Setting detail: THERAPIES SERIES
Discharge: HOME | End: 2024-02-28
Payer: COMMERCIAL

## 2024-02-28 PROCEDURE — 97110 THERAPEUTIC EXERCISES: CPT

## 2024-02-28 ASSESSMENT — PAIN SCALES - GENERAL: PAINLEVEL_OUTOF10: 0

## 2024-02-28 NOTE — PROGRESS NOTES
[address]   [place] [address]  Phone:[phone]      Physical TherapyTreatment Note        Date: 2024  Patient: Ingrid Moser  : 2000   Confirmed: Yes  MRN: [id number]  Referring Provider: Bhanu Comer MD      Medical Diagnosis: Foot drop, left foot [M21.372]  Puncture wound without foreign body, right lower leg, sequela [S81.831S]  Anesthesia of skin [R20.0]      Treatment Diagnosis: impaired ankle ROM, impaired LE strength, left ankle pain    Visit Information:  Insurance: Payor: [facility] / [facility] / Product Type: *No Product type* /   PT Visit Information  PT Insurance Information: [employer]  Total # of Visits to Date: 10 (corrected count)  No Show: 0  Canceled Appointment: 0  Progress Note Counter:  ( units 24-3/8/24)    Subjective Information:  Subjective: Pt reports the R Le is itchy but she can't itch it.  HEP Compliance:  [x] Good [] Fair [] Poor [] Reports not doing due to:    Pain Screening  Patient Currently in Pain: No  Pain Assessment: 0-10  Pain Level: 0    Treatment:  Exercises:  Exercises  Exercise 3: gastroc stretch on smartflexx 3 x30 sec  Exercise 7: 4 way hip x15 YTB  Exercise 8: SLS 15 secx3  Exercise 9: 6 inch step up fwd and lateral         Assessment:   Body Structures, Functions, Activity Limitations Requiring Skilled Therapeutic Intervention: Decreased ROM, Decreased strength, Decreased endurance, Decreased balance, Increased pain  Assessment: Pt started to feel a little nauseated during session. Session ended early. Pt with some discomfort with tb and on ankle this date.  Treatment Diagnosis: impaired ankle ROM, impaired LE strength, left ankle pain  Therapy Prognosis: Good          Post-Pain Assessment:       Pain Rating (0-10 pain scale):   0/10   Location and pain description same as pre-treatment unless indicated.   Action: [] NA   [x] Perform HEP  [] Meds as prescribed  [] Modalities as prescribed

## 2024-02-29 ENCOUNTER — HOSPITAL ENCOUNTER (OUTPATIENT)
Dept: PHYSICAL THERAPY | Age: 24
Setting detail: THERAPIES SERIES
Discharge: HOME | End: 2024-02-29
Payer: COMMERCIAL

## 2024-02-29 NOTE — PROGRESS NOTES
Therapy                            Cancellation/No-show Note    Date: 2024  Patient: Ingrid Moser (23 y.o. female)  : 2000  MRN:  [id number]  Referring Physician: Bhanu Comer MD    Medical Diagnosis: Foot drop, left foot [M21.372]  Puncture wound without foreign body, right lower leg, sequela [S81.831S]  Anesthesia of skin [R20.0]      Visit Information:  Insurance: Payor: [facility] / Plan: [facility] MEDICAID / Product Type: *No Product type* /   Visits to Date: 10 (corrected count)   No Show/Cancelled Appts: 0 / 1      For today's appointment patient:  [x]  Cancelled  []  Rescheduled appointment  []  No-show   []  Called pt to remind of next appointment     Reason given by patient:  []  Patient ill  []  Conflicting appointment  []  No transportation    []  Conflict with work  []  No reason given  [x]  Other:  pt stuck at other appt.     [x] Pt has future appointments scheduled, no follow up needed  [] Pt requests to be on hold.    Reason:   If > 2 weeks please discuss with therapist.  [] Therapist to call pt for follow up     Comments:       Signature: Electronically signed by Sis Zamora PTA on 24 at 2:34 PM EST

## 2024-03-02 DIAGNOSIS — R20.0 NUMBNESS OF RIGHT LOWER EXTREMITY: Primary | ICD-10-CM

## 2024-03-02 DIAGNOSIS — M21.372 ACQUIRED LEFT FOOT DROP: ICD-10-CM

## 2024-03-05 ENCOUNTER — HOSPITAL ENCOUNTER (OUTPATIENT)
Dept: PHYSICAL THERAPY | Age: 24
Setting detail: THERAPIES SERIES
Discharge: HOME | End: 2024-03-05
Payer: COMMERCIAL

## 2024-03-05 PROCEDURE — 97110 THERAPEUTIC EXERCISES: CPT

## 2024-03-05 ASSESSMENT — PAIN DESCRIPTION - DESCRIPTORS: DESCRIPTORS: ACHING

## 2024-03-05 ASSESSMENT — PAIN DESCRIPTION - ORIENTATION: ORIENTATION: RIGHT;LEFT

## 2024-03-05 ASSESSMENT — PAIN SCALES - GENERAL: PAINLEVEL_OUTOF10: 3

## 2024-03-05 ASSESSMENT — PAIN DESCRIPTION - LOCATION: LOCATION: LEG

## 2024-03-05 ASSESSMENT — PAIN DESCRIPTION - PAIN TYPE: TYPE: ACUTE PAIN

## 2024-03-05 NOTE — PROGRESS NOTES
[address]   [place] [address]  Phone:[phone]      Physical TherapyTreatment Note        Date: 3/5/2024  Patient: Ingrid Moser  : 2000   Confirmed: Yes  MRN: [id number]  Referring Provider: Bhanu Comer MD      Medical Diagnosis: Foot drop, left foot [M21.372]  Puncture wound without foreign body, right lower leg, sequela [S81.831S]  Anesthesia of skin [R20.0]      Treatment Diagnosis: impaired ankle ROM, impaired LE strength, left ankle pain    Visit Information:  Insurance: Payor: [facility] / [facility]: [facility] [place] [place] / Product Type: *No Product type* /   PT Visit Information  PT Insurance Information: [employer]  Total # of Visits to Date: 10 (corrected count)  No Show: 0  Canceled Appointment: 1  Progress Note Counter: 10/12 (29/ 48 units 24-3/8/24)    Subjective Information:  Subjective: Pt reports that the R ankle is fatigued this date.  HEP Compliance:  [x] Good [] Fair [] Poor [] Reports not doing due to:    Pain Screening  Patient Currently in Pain: No  Pain Assessment: 0-10  Pain Level: 3  Pain Type: Acute pain  Pain Location: Leg  Pain Orientation: Right, Left  Pain Descriptors: Aching    Treatment:  Exercises:  Exercises  Exercise 3: gastroc stretch on smartflexx 3 x30 sec  Exercise 7: 4 way hip x15 RTB  Exercise 8: SLS 15 secx3, SLS bounce off rebounder  Exercise 9: 8 inch step up fwd and lateral  Exercise 10: stand on bosuball, march, sls with 2 ue assist  Treatment Reasoning  Limitations addressed: Mobility, Strength, Flexibility  Functional ability(s) targeted: Ambulating community distances, Tolerance to age appropriate activities      *Indicates exercise, modality, or manual techniques to be initiated when appropriate    Objective Measures:   LTG 2 Current Status:: ankle ray DF          Assessment:   Body Structures, Functions, Activity Limitations Requiring Skilled Therapeutic Intervention: Decreased ROM, Decreased strength, Decreased

## 2024-03-07 ENCOUNTER — HOSPITAL ENCOUNTER (OUTPATIENT)
Dept: PHYSICAL THERAPY | Age: 24
Setting detail: THERAPIES SERIES
Discharge: HOME | End: 2024-03-07
Payer: COMMERCIAL

## 2024-03-07 PROCEDURE — 97110 THERAPEUTIC EXERCISES: CPT

## 2024-03-07 ASSESSMENT — PAIN SCALES - GENERAL: PAINLEVEL_OUTOF10: 0

## 2024-03-07 NOTE — PROGRESS NOTES
[address]   [place] [address]  Phone:[phone]      Physical TherapyTreatment Note        Date: 3/7/2024  Patient: Ingrid Moser  : 2000   Confirmed: Yes  MRN: [id number]  Referring Provider: Bhanu Comer MD      Medical Diagnosis: Foot drop, left foot [M21.372]  Puncture wound without foreign body, right lower leg, sequela [S81.831S]  Anesthesia of skin [R20.0]      Treatment Diagnosis: impaired ankle ROM, impaired LE strength, left ankle pain    Visit Information:  Insurance: Payor: [facility] / [facility] / Product Type: *No Product type* /   PT Visit Information  PT Insurance Information: [employer]  Total # of Visits to Date: 12 (corrected count)  No Show: 0  Canceled Appointment: 1  Progress Note Counter:  (31/ 48 units extensded through 24    Subjective Information:  Subjective: Pt reports that the legs were bothering her a little earlier just itchy now.  HEP Compliance:  [x] Good [] Fair [] Poor [] Reports not doing due to:    Pain Screening  Patient Currently in Pain: No  Pain Assessment: 0-10  Pain Level: 0    Treatment:  Exercises:  Exercises  Exercise 3: gastroc stretch on smartflexx 3 x30 sec  Exercise 4: lunges 20 feet x2  Exercise 5: sls on foam  Exercise 6: Heel raises in different directions  Exercise 7: 4 way hip x15 RTB  Exercise 9: clock exercises on one LE x6     *Indicates exercise, modality, or manual techniques to be initiated when appropriate    Assessment:   Body Structures, Functions, Activity Limitations Requiring Skilled Therapeutic Intervention: Decreased ROM, Decreased strength, Decreased endurance, Decreased balance, Increased pain  Assessment: Pt with fatigue with LE strengthening exercises.No increased pain noted.  Treatment Diagnosis: impaired ankle ROM, impaired LE strength, left ankle pain  Therapy Prognosis: Good          Post-Pain Assessment:       Pain Rating (0-10 pain scale):  0 /10   Location and pain

## 2024-03-12 ENCOUNTER — OFFICE VISIT (OUTPATIENT)
Dept: FAMILY MEDICINE CLINIC | Age: 24
End: 2024-03-12
Payer: COMMERCIAL

## 2024-03-12 VITALS
BODY MASS INDEX: 30.36 KG/M2 | SYSTOLIC BLOOD PRESSURE: 124 MMHG | OXYGEN SATURATION: 100 % | HEART RATE: 68 BPM | DIASTOLIC BLOOD PRESSURE: 62 MMHG | WEIGHT: 171.4 LBS

## 2024-03-12 DIAGNOSIS — Z13.220 SCREENING CHOLESTEROL LEVEL: ICD-10-CM

## 2024-03-12 DIAGNOSIS — Z11.4 SCREENING FOR HIV WITHOUT PRESENCE OF RISK FACTORS: ICD-10-CM

## 2024-03-12 DIAGNOSIS — Z12.4 SCREENING FOR CERVICAL CANCER: ICD-10-CM

## 2024-03-12 DIAGNOSIS — Z11.8 SCREENING FOR CHLAMYDIAL DISEASE: ICD-10-CM

## 2024-03-12 DIAGNOSIS — Z00.00 ENCOUNTER FOR WELL ADULT EXAM WITHOUT ABNORMAL FINDINGS: Primary | ICD-10-CM

## 2024-03-12 DIAGNOSIS — E66.09 CLASS 1 OBESITY DUE TO EXCESS CALORIES WITHOUT SERIOUS COMORBIDITY WITH BODY MASS INDEX (BMI) OF 30.0 TO 30.9 IN ADULT: ICD-10-CM

## 2024-03-12 DIAGNOSIS — E55.9 VITAMIN D DEFICIENCY: ICD-10-CM

## 2024-03-12 DIAGNOSIS — H61.23 IMPACTED CERUMEN, BILATERAL: ICD-10-CM

## 2024-03-12 DIAGNOSIS — Z11.3 SCREENING FOR GONORRHEA: ICD-10-CM

## 2024-03-12 DIAGNOSIS — Z11.59 NEED FOR HEPATITIS C SCREENING TEST: ICD-10-CM

## 2024-03-12 PROBLEM — S81.832S: Status: RESOLVED | Noted: 2024-01-11 | Resolved: 2024-03-12

## 2024-03-12 PROBLEM — B00.9 HSV-2 INFECTION: Status: RESOLVED | Noted: 2018-12-18 | Resolved: 2024-03-12

## 2024-03-12 PROBLEM — S81.831S: Status: RESOLVED | Noted: 2024-01-11 | Resolved: 2024-03-12

## 2024-03-12 PROBLEM — B00.9 HSV-2 INFECTION: Status: ACTIVE | Noted: 2018-12-18

## 2024-03-12 PROBLEM — Z87.828 HISTORY OF GUNSHOT WOUND: Chronic | Status: ACTIVE | Noted: 2024-03-12

## 2024-03-12 PROCEDURE — 99395 PREV VISIT EST AGE 18-39: CPT | Performed by: FAMILY MEDICINE

## 2024-03-12 PROCEDURE — G8484 FLU IMMUNIZE NO ADMIN: HCPCS | Performed by: FAMILY MEDICINE

## 2024-03-12 ASSESSMENT — ENCOUNTER SYMPTOMS
EYE DISCHARGE: 0
ABDOMINAL PAIN: 0
CHEST TIGHTNESS: 0
COUGH: 0
RHINORRHEA: 0
NAUSEA: 0
BLOOD IN STOOL: 0
DIARRHEA: 0
WHEEZING: 0
SINUS PRESSURE: 0
SHORTNESS OF BREATH: 0
ANAL BLEEDING: 0
ABDOMINAL DISTENTION: 0
SORE THROAT: 0
COLOR CHANGE: 0
VOMITING: 0
EYE PAIN: 0
TROUBLE SWALLOWING: 0
CONSTIPATION: 0

## 2024-03-12 NOTE — ASSESSMENT & PLAN NOTE
Patient given information on healthy dietary choices and the benefits of a lower carbohydrate diet as appropriate. Patient also counseled on benefits of moderate intensity cardiovascular exercise for 150 minutes per week as they are able. Advice was given to make small changes over time, setting smaller achievable goals until recommended lifestyle changes are reached.

## 2024-03-12 NOTE — PATIENT INSTRUCTIONS
yourself a pep talk and a pat on the back.  Get professional help. A dietitian can help you make your diet healthier while still allowing you to eat foods that you enjoy. A  or physical therapist can help design an exercise program that is fun and easy to stay on. A counselor, a , or your doctor can help you overcome hurdles, reduce stress, or quit smoking.  Where can you learn more?  Go to https://www.[employer].net/patientEd and enter E882 to learn more about \"Learning About Changing a Habit by Setting Goals.\"  Current as of: June 24, 2023               Content Version: 14.0  © 6291-9964 [employer].   Care instructions adapted under license by [employer]. If you have questions about a medical condition or this instruction, always ask your healthcare professional. [employer] disclaims any warranty or liability for your use of this information.

## 2024-03-12 NOTE — PROGRESS NOTES
Ingrid Moser (: 2000) is a 23 y.o. female, Established patient, who presents today for:    Chief Complaint   Patient presents with    Employment Physical         ASSESSMENT/PLAN:    1. Encounter for well adult exam without abnormal findings  Comments:  23-year-old female with exam as listed below. Workplace physical form filled out and signed today in office.  2. Screening for cervical cancer  -     Yanelis Hay DO, OB-GYN, [place]  3. Screening cholesterol level  -     Lipid Panel; Future  4. Screening for chlamydial disease  -     C.trachomatis N.gonorrhoeae DNA, Urine [[id number]]; Future  5. Screening for gonorrhea  -     C.trachomatis N.gonorrhoeae DNA, Urine [[id number]]; Future  6. Screening for HIV without presence of risk factors  -     HIV Screen; Future  7. Need for hepatitis C screening test  -     Hepatitis C Antibody; Future  8. Impacted cerumen, bilateral  Comments:  Partially successful irrigation of impacted cerumen completed in office with water and hydrogen peroxide. Would refer to ENT if there is persistent ear fullness  Orders:  -     EAR CERUMEN REMOVAL  9. Vitamin D deficiency  -     Vitamin D 25 Hydroxy; Future  10. Class 1 obesity due to excess calories without serious comorbidity with body mass index (BMI) of 30.0 to 30.9 in adult  Assessment & Plan:  Patient given information on healthy dietary choices and the benefits of a lower carbohydrate diet as appropriate. Patient also counseled on benefits of moderate intensity cardiovascular exercise for 150 minutes per week as they are able. Advice was given to make small changes over time, setting smaller achievable goals until recommended lifestyle changes are reached.       Return in about 1 year (around 3/12/2025) for Annual Physical.       SUBJECTIVE/OBJECTIVE:    HPI    Patient presents for routine well visit    Review of Systems   Constitutional:  Negative for appetite change, chills, diaphoresis, fatigue, fever and

## 2024-05-13 ENCOUNTER — HOSPITAL ENCOUNTER (OUTPATIENT)
Age: 24
Setting detail: SPECIMEN
Discharge: HOME | End: 2024-05-13
Payer: COMMERCIAL

## 2024-05-13 ENCOUNTER — OFFICE VISIT (OUTPATIENT)
Dept: FAMILY MEDICINE CLINIC | Age: 24
End: 2024-05-13
Payer: COMMERCIAL

## 2024-05-13 VITALS
BODY MASS INDEX: 30.3 KG/M2 | HEART RATE: 93 BPM | SYSTOLIC BLOOD PRESSURE: 106 MMHG | TEMPERATURE: 97.8 F | DIASTOLIC BLOOD PRESSURE: 64 MMHG | HEIGHT: 63 IN | OXYGEN SATURATION: 98 % | WEIGHT: 171 LBS

## 2024-05-13 DIAGNOSIS — J02.9 ACUTE VIRAL PHARYNGITIS: Primary | ICD-10-CM

## 2024-05-13 DIAGNOSIS — J02.9 SORE THROAT: ICD-10-CM

## 2024-05-13 LAB
HETEROPHILE ANTIBODIES: NORMAL
S PYO AG THROAT QL: NORMAL

## 2024-05-13 PROCEDURE — 87070 CULTURE OTHR SPECIMN AEROBIC: CPT

## 2024-05-13 PROCEDURE — 99213 OFFICE O/P EST LOW 20 MIN: CPT | Performed by: FAMILY MEDICINE

## 2024-05-13 PROCEDURE — G8417 CALC BMI ABV UP PARAM F/U: HCPCS | Performed by: FAMILY MEDICINE

## 2024-05-13 PROCEDURE — G8427 DOCREV CUR MEDS BY ELIG CLIN: HCPCS | Performed by: FAMILY MEDICINE

## 2024-05-13 PROCEDURE — 1036F TOBACCO NON-USER: CPT | Performed by: FAMILY MEDICINE

## 2024-05-13 PROCEDURE — 86308 HETEROPHILE ANTIBODY SCREEN: CPT | Performed by: FAMILY MEDICINE

## 2024-05-13 PROCEDURE — 87880 STREP A ASSAY W/OPTIC: CPT | Performed by: FAMILY MEDICINE

## 2024-05-13 PROCEDURE — 86403 PARTICLE AGGLUT ANTBDY SCRN: CPT

## 2024-05-13 ASSESSMENT — ENCOUNTER SYMPTOMS
SINUS PAIN: 0
SINUS PRESSURE: 0
VOMITING: 0
NAUSEA: 0
SWOLLEN GLANDS: 0
SHORTNESS OF BREATH: 0
COUGH: 0
CHEST TIGHTNESS: 0
TROUBLE SWALLOWING: 0
DIARRHEA: 0
WHEEZING: 0
SORE THROAT: 1

## 2024-05-13 NOTE — ASSESSMENT & PLAN NOTE
In office rapid strep and mono testing negative. Will send throat culture for further evaluation. Will treat symptomatically for suspected viral pharyngitis with alternating tylenol and motrin or aleve, throat lozenges, chloraseptic spray, and salt water gargles. If there is inability to swallow due to worsening pain or any throat swelling or fullness despite conservative management over the next 5-7 days patient was instructed to go to ER for acute evaluation/management.

## 2024-05-13 NOTE — PROGRESS NOTES
Mouth: Mucous membranes are moist. No oral lesions.      Tongue: No lesions.      Palate: No mass and lesions.      Pharynx: Oropharynx is clear. Uvula midline. No pharyngeal swelling, oropharyngeal exudate, posterior oropharyngeal erythema or uvula swelling.      Tonsils: No tonsillar exudate. 2+ on the right. 1+ on the left.      Comments: Tonsil stones noted bilaterally  Eyes:      General:         Right eye: No discharge.         Left eye: No discharge.      Conjunctiva/sclera: Conjunctivae normal.      Pupils: Pupils are equal, round, and reactive to light.   Neck:      Thyroid: No thyroid mass, thyromegaly or thyroid tenderness.   Cardiovascular:      Rate and Rhythm: Normal rate and regular rhythm.      Heart sounds: No murmur heard.  Pulmonary:      Effort: Pulmonary effort is normal. No respiratory distress.      Breath sounds: Normal breath sounds. No wheezing, rhonchi or rales.   Abdominal:      General: Bowel sounds are normal.      Palpations: Abdomen is soft. There is no hepatomegaly or splenomegaly.      Tenderness: There is no abdominal tenderness. There is no guarding or rebound.   Musculoskeletal:      Cervical back: Neck supple. No rigidity. No pain with movement.      Right lower leg: No edema.      Left lower leg: No edema.   Lymphadenopathy:      Head:      Right side of head: No submental, submandibular, preauricular, posterior auricular or occipital adenopathy.      Left side of head: No submental, submandibular, preauricular, posterior auricular or occipital adenopathy.      Cervical: No cervical adenopathy.      Right cervical: No superficial or posterior cervical adenopathy.     Left cervical: No superficial or posterior cervical adenopathy.   Skin:     Findings: No rash.   Neurological:      Mental Status: She is alert and oriented to person, place, and time.   Psychiatric:         Mood and Affect: Mood normal.         Behavior: Behavior normal.         Thought Content: Thought content

## 2024-05-16 LAB — BACTERIA THROAT AEROBE CULT: NORMAL

## 2024-05-16 NOTE — RESULT ENCOUNTER NOTE
Please notify patient that recent throat culture returned negative.  Patient should continue with supportive care like we discussed in the office.

## 2024-05-23 ENCOUNTER — HOSPITAL ENCOUNTER (OUTPATIENT)
Dept: LAB | Age: 24
Discharge: HOME | End: 2024-05-23
Payer: COMMERCIAL

## 2024-05-23 ENCOUNTER — OFFICE VISIT (OUTPATIENT)
Dept: FAMILY MEDICINE CLINIC | Age: 24
End: 2024-05-23
Payer: COMMERCIAL

## 2024-05-23 VITALS
WEIGHT: 174.4 LBS | SYSTOLIC BLOOD PRESSURE: 100 MMHG | TEMPERATURE: 97.1 F | HEART RATE: 76 BPM | DIASTOLIC BLOOD PRESSURE: 60 MMHG | BODY MASS INDEX: 30.89 KG/M2 | OXYGEN SATURATION: 98 %

## 2024-05-23 DIAGNOSIS — J02.9 ACUTE PHARYNGITIS, UNSPECIFIED ETIOLOGY: Primary | ICD-10-CM

## 2024-05-23 DIAGNOSIS — J02.9 ACUTE PHARYNGITIS, UNSPECIFIED ETIOLOGY: ICD-10-CM

## 2024-05-23 DIAGNOSIS — R43.2 DYSGEUSIA: ICD-10-CM

## 2024-05-23 DIAGNOSIS — E55.9 VITAMIN D DEFICIENCY: ICD-10-CM

## 2024-05-23 LAB
ALBUMIN SERPL-MCNC: 4.3 G/DL (ref 3.5–4.6)
ALP SERPL-CCNC: 62 U/L (ref 40–130)
ALT SERPL-CCNC: 12 U/L (ref 0–33)
ANION GAP SERPL CALCULATED.3IONS-SCNC: 9 MEQ/L (ref 9–15)
AST SERPL-CCNC: 21 U/L (ref 0–35)
BASOPHILS # BLD: 0 K/UL (ref 0–0.2)
BASOPHILS NFR BLD: 0.3 %
BILIRUB SERPL-MCNC: 0.3 MG/DL (ref 0.2–0.7)
BUN SERPL-MCNC: 14 MG/DL (ref 6–20)
CALCIUM SERPL-MCNC: 8.8 MG/DL (ref 8.5–9.9)
CHLORIDE SERPL-SCNC: 104 MEQ/L (ref 95–107)
CO2 SERPL-SCNC: 26 MEQ/L (ref 20–31)
CREAT SERPL-MCNC: 0.87 MG/DL (ref 0.5–0.9)
EOSINOPHIL # BLD: 0.1 K/UL (ref 0–0.7)
EOSINOPHIL NFR BLD: 1.6 %
ERYTHROCYTE [DISTWIDTH] IN BLOOD BY AUTOMATED COUNT: 12.6 % (ref 11.5–14.5)
GLOBULIN SER CALC-MCNC: 3 G/DL (ref 2.3–3.5)
GLUCOSE SERPL-MCNC: 91 MG/DL (ref 70–99)
HCT VFR BLD AUTO: 38.9 % (ref 37–47)
HGB BLD-MCNC: 12.2 G/DL (ref 12–16)
LYMPHOCYTES # BLD: 2.3 K/UL (ref 1–4.8)
LYMPHOCYTES NFR BLD: 26.6 %
MCH RBC QN AUTO: 26.9 PG (ref 27–31.3)
MCHC RBC AUTO-ENTMCNC: 31.4 % (ref 33–37)
MCV RBC AUTO: 85.7 FL (ref 79.4–94.8)
MONOCYTES # BLD: 0.6 K/UL (ref 0.2–0.8)
MONOCYTES NFR BLD: 7.4 %
NEUTROPHILS # BLD: 5.6 K/UL (ref 1.4–6.5)
NEUTS SEG NFR BLD: 64 %
PLATELET # BLD AUTO: 325 K/UL (ref 130–400)
POTASSIUM SERPL-SCNC: 4 MEQ/L (ref 3.4–4.9)
PROT SERPL-MCNC: 7.3 G/DL (ref 6.3–8)
RBC # BLD AUTO: 4.54 M/UL (ref 4.2–5.4)
S PYO AG THROAT QL: NORMAL
SODIUM SERPL-SCNC: 139 MEQ/L (ref 135–144)
WBC # BLD AUTO: 8.7 K/UL (ref 4.8–10.8)

## 2024-05-23 PROCEDURE — 99214 OFFICE O/P EST MOD 30 MIN: CPT | Performed by: FAMILY MEDICINE

## 2024-05-23 PROCEDURE — 86403 PARTICLE AGGLUT ANTBDY SCRN: CPT

## 2024-05-23 PROCEDURE — 85025 COMPLETE CBC W/AUTO DIFF WBC: CPT

## 2024-05-23 PROCEDURE — 83655 ASSAY OF LEAD: CPT

## 2024-05-23 PROCEDURE — 87070 CULTURE OTHR SPECIMN AEROBIC: CPT

## 2024-05-23 PROCEDURE — 36415 COLL VENOUS BLD VENIPUNCTURE: CPT

## 2024-05-23 PROCEDURE — G8427 DOCREV CUR MEDS BY ELIG CLIN: HCPCS | Performed by: FAMILY MEDICINE

## 2024-05-23 PROCEDURE — G8417 CALC BMI ABV UP PARAM F/U: HCPCS | Performed by: FAMILY MEDICINE

## 2024-05-23 PROCEDURE — 82175 ASSAY OF ARSENIC: CPT

## 2024-05-23 PROCEDURE — 83825 ASSAY OF MERCURY: CPT

## 2024-05-23 PROCEDURE — 80053 COMPREHEN METABOLIC PANEL: CPT

## 2024-05-23 PROCEDURE — 82306 VITAMIN D 25 HYDROXY: CPT

## 2024-05-23 PROCEDURE — 82746 ASSAY OF FOLIC ACID SERUM: CPT

## 2024-05-23 PROCEDURE — 87880 STREP A ASSAY W/OPTIC: CPT | Performed by: FAMILY MEDICINE

## 2024-05-23 PROCEDURE — 82607 VITAMIN B-12: CPT

## 2024-05-23 PROCEDURE — 1036F TOBACCO NON-USER: CPT | Performed by: FAMILY MEDICINE

## 2024-05-23 RX ORDER — METRONIDAZOLE 500 MG/1
500 TABLET ORAL 2 TIMES DAILY
COMMUNITY
Start: 2024-05-17 | End: 2024-05-24
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
## 2024-05-24 LAB
FOLATE: 6.5 NG/ML (ref 4.8–24.2)
VITAMIN B-12: 534 PG/ML (ref 232–1245)
VITAMIN D 25-HYDROXY: 22.6 NG/ML (ref 30–100)

## 2024-05-26 LAB
BACTERIA THROAT AEROBE CULT: ABNORMAL
BACTERIA THROAT AEROBE CULT: ABNORMAL
ORGANISM: ABNORMAL

## 2024-05-27 LAB
ARSENIC BLD-MCNC: <10 UG/L
LEAD BLD-MCNC: <2 UG/DL
MERCURY BLD-MCNC: <2.5 UG/L

## 2024-05-27 RX ORDER — AMOXICILLIN 500 MG/1
500 CAPSULE ORAL 2 TIMES DAILY
Qty: 20 CAPSULE | Refills: 0 | Status: SHIPPED | OUTPATIENT
Start: 2024-05-27 | End: 2024-06-06

## 2024-05-27 ASSESSMENT — ENCOUNTER SYMPTOMS
NAUSEA: 0
SINUS PRESSURE: 0
ABDOMINAL PAIN: 0
SORE THROAT: 1
RHINORRHEA: 0
SHORTNESS OF BREATH: 0
VOMITING: 0
SINUS PAIN: 0
APNEA: 0
COUGH: 0
BLOOD IN STOOL: 0
CHEST TIGHTNESS: 0
DIARRHEA: 0
CONSTIPATION: 0

## 2024-06-04 DIAGNOSIS — E55.9 VITAMIN D DEFICIENCY: Primary | ICD-10-CM

## 2024-06-04 RX ORDER — ERGOCALCIFEROL 1.25 MG/1
50000 CAPSULE ORAL WEEKLY
Qty: 12 CAPSULE | Refills: 0 | Status: SHIPPED | OUTPATIENT
Start: 2024-06-04 | End: 2024-08-27

## 2024-06-12 ENCOUNTER — OFFICE VISIT (OUTPATIENT)
Dept: FAMILY MEDICINE CLINIC | Age: 24
End: 2024-06-12
Payer: COMMERCIAL

## 2024-06-12 VITALS
TEMPERATURE: 97 F | HEART RATE: 76 BPM | SYSTOLIC BLOOD PRESSURE: 90 MMHG | OXYGEN SATURATION: 99 % | BODY MASS INDEX: 31.53 KG/M2 | DIASTOLIC BLOOD PRESSURE: 60 MMHG | WEIGHT: 178 LBS

## 2024-06-12 DIAGNOSIS — M21.372 ACQUIRED LEFT FOOT DROP: Primary | ICD-10-CM

## 2024-06-12 DIAGNOSIS — R20.0 NUMBNESS OF RIGHT LOWER EXTREMITY: ICD-10-CM

## 2024-06-12 PROCEDURE — G8417 CALC BMI ABV UP PARAM F/U: HCPCS | Performed by: FAMILY MEDICINE

## 2024-06-12 PROCEDURE — 1036F TOBACCO NON-USER: CPT | Performed by: FAMILY MEDICINE

## 2024-06-12 PROCEDURE — G8427 DOCREV CUR MEDS BY ELIG CLIN: HCPCS | Performed by: FAMILY MEDICINE

## 2024-06-12 PROCEDURE — 99213 OFFICE O/P EST LOW 20 MIN: CPT | Performed by: FAMILY MEDICINE
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
## 2024-06-19 ASSESSMENT — ENCOUNTER SYMPTOMS
VOMITING: 0
APNEA: 0
CHEST TIGHTNESS: 0
COUGH: 0
BLOOD IN STOOL: 0
CONSTIPATION: 0
NAUSEA: 0
SHORTNESS OF BREATH: 0
ABDOMINAL PAIN: 0
DIARRHEA: 0

## 2024-07-26 ENCOUNTER — HOSPITAL ENCOUNTER (OUTPATIENT)
Dept: RADIOLOGY | Facility: HOSPITAL | Age: 24
Discharge: HOME | End: 2024-07-26
Payer: COMMERCIAL

## 2024-07-26 DIAGNOSIS — N94.10 UNSPECIFIED DYSPAREUNIA: ICD-10-CM

## 2024-07-26 PROCEDURE — 76856 US EXAM PELVIC COMPLETE: CPT

## 2024-08-09 ENCOUNTER — APPOINTMENT (OUTPATIENT)
Dept: OBSTETRICS AND GYNECOLOGY | Facility: CLINIC | Age: 24
End: 2024-08-09
Payer: COMMERCIAL

## 2024-08-09 ASSESSMENT — ENCOUNTER SYMPTOMS
BACK PAIN: 0
DIARRHEA: 0
ABDOMINAL PAIN: 0
HEMATURIA: 0
NAUSEA: 0
FREQUENCY: 1
FEVER: 0
VOMITING: 0
CONSTIPATION: 0
HEADACHES: 0
FLANK PAIN: 0
ANOREXIA: 0
DYSURIA: 0
CHILLS: 0
SORE THROAT: 0

## 2024-08-28 ENCOUNTER — APPOINTMENT (OUTPATIENT)
Dept: OBSTETRICS AND GYNECOLOGY | Facility: CLINIC | Age: 24
End: 2024-08-28
Payer: COMMERCIAL

## 2024-08-28 VITALS — DIASTOLIC BLOOD PRESSURE: 58 MMHG | SYSTOLIC BLOOD PRESSURE: 104 MMHG | WEIGHT: 183.3 LBS

## 2024-08-28 DIAGNOSIS — N89.8 VAGINAL ODOR: Primary | ICD-10-CM

## 2024-08-28 PROCEDURE — 99212 OFFICE O/P EST SF 10 MIN: CPT | Performed by: ADVANCED PRACTICE MIDWIFE

## 2024-08-28 PROCEDURE — 87205 SMEAR GRAM STAIN: CPT

## 2024-08-28 ASSESSMENT — ENCOUNTER SYMPTOMS
CHILLS: 0
FLANK PAIN: 0
HEADACHES: 0
CONSTIPATION: 0
FREQUENCY: 1
VOMITING: 0
DYSURIA: 0
DIARRHEA: 0
SORE THROAT: 0
NAUSEA: 0
ABDOMINAL PAIN: 0
FEVER: 0
BACK PAIN: 0
HEMATURIA: 0
ANOREXIA: 0

## 2024-08-28 NOTE — PROGRESS NOTES
GYNECOLOGY PROGRESS NOTE        CC:    Chief Complaint   Patient presents with    New Patient Visit     New patient visit.           HPI:  Mary Thomas is her with a complaints of vaginal odor since June/July. No discharge or odor.   She denies any new sexual partners, recent antibiotics, or new soaps or detergents.  Prior STDs denies, recurrent vaginitis denies.      ROS:  GYN - see HPI        PHYSICAL EXAM:  /58 (BP Location: Left arm, Patient Position: Sitting)   Wt 83.1 kg (183 lb 4.8 oz)   GEN:  A&O, NAD  URO:  normal urethra, bladder NT  GYN:  normal vulva and perineum w/o lesions or ulcers, vagina with small amount of thick clumpy discharge.  LYMPH:  no inguinal lymphadenopathy  PSYCH:  normal affect, non-anxious      IMPRESSION/PLAN:    A: scant white clumpy discharge noted.  Plan: 1. Vaginal discharge. 2. Treat based on results.  Problem List Items Addressed This Visit    None  Visit Diagnoses       Vaginal odor    -  Primary             GURJIT Sotelo-SONAM

## 2024-08-28 NOTE — PROGRESS NOTES
GYNECOLOGY PROGRESS NOTE        CC:  error note  Chief Complaint   Patient presents with    New Patient Visit     New patient visit.           HPI:        ROS:  GYN - see HPI        PHYSICAL EXAM:  /58 (BP Location: Left arm, Patient Position: Sitting)   Wt 83.1 kg (183 lb 4.8 oz)   GEN:  A&O, NAD  URO:  normal urethra, bladder NT  Lizzette Soto RN

## 2024-08-29 LAB
CLUE CELLS VAG LPF-#/AREA: PRESENT /[LPF]
NUGENT SCORE: 8
YEAST VAG WET PREP-#/AREA: ABNORMAL

## 2024-08-30 DIAGNOSIS — A49.8 GARDNERELLA INFECTION: Primary | ICD-10-CM

## 2024-08-30 RX ORDER — METRONIDAZOLE 500 MG/1
500 TABLET ORAL 2 TIMES DAILY
Qty: 14 TABLET | Refills: 0 | Status: SHIPPED | OUTPATIENT
Start: 2024-08-30 | End: 2024-09-06

## 2025-08-05 ENCOUNTER — APPOINTMENT (OUTPATIENT)
Dept: PRIMARY CARE | Facility: CLINIC | Age: 25
End: 2025-08-05
Payer: COMMERCIAL

## 2025-08-05 VITALS
TEMPERATURE: 97 F | RESPIRATION RATE: 18 BRPM | DIASTOLIC BLOOD PRESSURE: 70 MMHG | BODY MASS INDEX: 34.59 KG/M2 | HEIGHT: 63 IN | SYSTOLIC BLOOD PRESSURE: 106 MMHG | HEART RATE: 83 BPM | OXYGEN SATURATION: 98 % | WEIGHT: 195.2 LBS

## 2025-08-05 DIAGNOSIS — Z13.0 SCREENING FOR DEFICIENCY ANEMIA: ICD-10-CM

## 2025-08-05 DIAGNOSIS — H61.22 IMPACTED CERUMEN OF LEFT EAR: ICD-10-CM

## 2025-08-05 DIAGNOSIS — Z13.220 LIPID SCREENING: ICD-10-CM

## 2025-08-05 DIAGNOSIS — Z13.1 DIABETES MELLITUS SCREENING: ICD-10-CM

## 2025-08-05 DIAGNOSIS — Z11.1 SCREENING EXAMINATION FOR PULMONARY TUBERCULOSIS: ICD-10-CM

## 2025-08-05 DIAGNOSIS — Z02.1 ENCOUNTER FOR PRE-EMPLOYMENT HEALTH SCREENING EXAMINATION: Primary | ICD-10-CM

## 2025-08-05 PROCEDURE — 3008F BODY MASS INDEX DOCD: CPT

## 2025-08-05 PROCEDURE — 99203 OFFICE O/P NEW LOW 30 MIN: CPT

## 2025-08-05 PROCEDURE — 1036F TOBACCO NON-USER: CPT

## 2025-08-05 ASSESSMENT — PATIENT HEALTH QUESTIONNAIRE - PHQ9
SUM OF ALL RESPONSES TO PHQ9 QUESTIONS 1 AND 2: 0
2. FEELING DOWN, DEPRESSED OR HOPELESS: NOT AT ALL
1. LITTLE INTEREST OR PLEASURE IN DOING THINGS: NOT AT ALL
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
## 2025-08-07 LAB
ANION GAP SERPL CALCULATED.4IONS-SCNC: 8 MMOL/L (CALC) (ref 7–17)
BUN SERPL-MCNC: 10 MG/DL (ref 7–25)
BUN/CREAT SERPL: 10 (CALC) (ref 6–22)
CALCIUM SERPL-MCNC: 8.9 MG/DL (ref 8.6–10.2)
CHLORIDE SERPL-SCNC: 103 MMOL/L (ref 98–110)
CHOLEST SERPL-MCNC: 167 MG/DL
CHOLEST/HDLC SERPL: 2.9 (CALC)
CO2 SERPL-SCNC: 25 MMOL/L (ref 20–32)
CREAT SERPL-MCNC: 0.98 MG/DL (ref 0.5–0.96)
EGFRCR SERPLBLD CKD-EPI 2021: 82 ML/MIN/1.73M2
ERYTHROCYTE [DISTWIDTH] IN BLOOD BY AUTOMATED COUNT: 13 % (ref 11–15)
EST. AVERAGE GLUCOSE BLD GHB EST-MCNC: 108 MG/DL
EST. AVERAGE GLUCOSE BLD GHB EST-SCNC: 6 MMOL/L
GLUCOSE SERPL-MCNC: 84 MG/DL (ref 65–99)
HBA1C MFR BLD: 5.4 %
HCT VFR BLD AUTO: 38 % (ref 35–45)
HDLC SERPL-MCNC: 57 MG/DL
HGB BLD-MCNC: 12 G/DL (ref 11.7–15.5)
IGNF NEG CNTRL BLD: NORMAL
LDLC SERPL CALC-MCNC: 94 MG/DL (CALC)
M TB IFN-G BLD-IMP: NEGATIVE
MCH RBC QN AUTO: 27.3 PG (ref 27–33)
MCHC RBC AUTO-ENTMCNC: 31.6 G/DL (ref 32–36)
MCV RBC AUTO: 86.6 FL (ref 80–100)
MITOGEN IGNF.SPOT COUNT BLD: NORMAL
NONHDLC SERPL-MCNC: 110 MG/DL (CALC)
PLATELET # BLD AUTO: 301 THOUSAND/UL (ref 140–400)
PMV BLD REES-ECKER: 10.2 FL (ref 7.5–12.5)
POTASSIUM SERPL-SCNC: 4.4 MMOL/L (ref 3.5–5.3)
QUEST PANEL A SPOT COUNT: 0
QUEST PANEL B SPOT COUNT: 0
RBC # BLD AUTO: 4.39 MILLION/UL (ref 3.8–5.1)
SODIUM SERPL-SCNC: 136 MMOL/L (ref 135–146)
TRIGL SERPL-MCNC: 72 MG/DL
WBC # BLD AUTO: 7.5 THOUSAND/UL (ref 3.8–10.8)

## 2025-08-13 ENCOUNTER — TELEPHONE (OUTPATIENT)
Dept: PRIMARY CARE | Facility: CLINIC | Age: 25
End: 2025-08-13
Payer: COMMERCIAL